# Patient Record
Sex: MALE | Race: WHITE | Employment: FULL TIME
[De-identification: names, ages, dates, MRNs, and addresses within clinical notes are randomized per-mention and may not be internally consistent; named-entity substitution may affect disease eponyms.]

---

## 2017-01-04 RX ORDER — OXYCODONE HYDROCHLORIDE AND ACETAMINOPHEN 5; 325 MG/1; MG/1
TABLET ORAL
Qty: 150 TABLET | Refills: 0 | Status: SHIPPED | OUTPATIENT
Start: 2017-01-04 | End: 2017-02-01 | Stop reason: SDUPTHER

## 2017-02-01 RX ORDER — OXYCODONE HYDROCHLORIDE AND ACETAMINOPHEN 5; 325 MG/1; MG/1
TABLET ORAL
Qty: 150 TABLET | Refills: 0 | Status: SHIPPED | OUTPATIENT
Start: 2017-02-01 | End: 2017-03-06 | Stop reason: SDUPTHER

## 2017-03-01 RX ORDER — OXYCODONE HYDROCHLORIDE AND ACETAMINOPHEN 5; 325 MG/1; MG/1
TABLET ORAL
Qty: 150 TABLET | Refills: 0 | OUTPATIENT
Start: 2017-03-01

## 2017-03-06 ENCOUNTER — OFFICE VISIT (OUTPATIENT)
Dept: FAMILY MEDICINE CLINIC | Facility: CLINIC | Age: 42
End: 2017-03-06

## 2017-03-06 VITALS
DIASTOLIC BLOOD PRESSURE: 80 MMHG | BODY MASS INDEX: 27.2 KG/M2 | HEART RATE: 88 BPM | WEIGHT: 209 LBS | SYSTOLIC BLOOD PRESSURE: 110 MMHG | OXYGEN SATURATION: 98 %

## 2017-03-06 DIAGNOSIS — G89.29 CHRONIC LOW BACK PAIN WITH SCIATICA, SCIATICA LATERALITY UNSPECIFIED, UNSPECIFIED BACK PAIN LATERALITY: ICD-10-CM

## 2017-03-06 DIAGNOSIS — E13.9 DIABETES 1.5, MANAGED AS TYPE 2 (HCC): Primary | ICD-10-CM

## 2017-03-06 DIAGNOSIS — M54.40 CHRONIC LOW BACK PAIN WITH SCIATICA, SCIATICA LATERALITY UNSPECIFIED, UNSPECIFIED BACK PAIN LATERALITY: ICD-10-CM

## 2017-03-06 DIAGNOSIS — M79.672 FOOT PAIN, BILATERAL: ICD-10-CM

## 2017-03-06 DIAGNOSIS — M79.671 FOOT PAIN, BILATERAL: ICD-10-CM

## 2017-03-06 LAB — HBA1C MFR BLD: 6.6 %

## 2017-03-06 PROCEDURE — 83036 HEMOGLOBIN GLYCOSYLATED A1C: CPT

## 2017-03-06 PROCEDURE — 99214 OFFICE O/P EST MOD 30 MIN: CPT

## 2017-03-06 RX ORDER — DULOXETIN HYDROCHLORIDE 60 MG/1
60 CAPSULE, DELAYED RELEASE ORAL DAILY
Qty: 30 CAPSULE | Refills: 3 | Status: SHIPPED | OUTPATIENT
Start: 2017-03-06 | End: 2017-06-21 | Stop reason: SDUPTHER

## 2017-03-06 RX ORDER — OXYCODONE HYDROCHLORIDE AND ACETAMINOPHEN 5; 325 MG/1; MG/1
TABLET ORAL
Qty: 150 TABLET | Refills: 0 | Status: SHIPPED | OUTPATIENT
Start: 2017-03-06 | End: 2017-03-29 | Stop reason: SDUPTHER

## 2017-03-06 RX ORDER — ALPRAZOLAM 0.25 MG/1
0.25 TABLET ORAL DAILY PRN
Qty: 30 TABLET | Refills: 5 | Status: SHIPPED | OUTPATIENT
Start: 2017-03-06 | End: 2017-06-21 | Stop reason: SDUPTHER

## 2017-03-06 ASSESSMENT — ENCOUNTER SYMPTOMS
SHORTNESS OF BREATH: 0
VOMITING: 0
COUGH: 0
EYE PAIN: 0
CHEST TIGHTNESS: 0
SORE THROAT: 0
NAUSEA: 0
EYE DISCHARGE: 0

## 2017-03-06 ASSESSMENT — PATIENT HEALTH QUESTIONNAIRE - PHQ9
1. LITTLE INTEREST OR PLEASURE IN DOING THINGS: 0
SUM OF ALL RESPONSES TO PHQ QUESTIONS 1-9: 0
SUM OF ALL RESPONSES TO PHQ9 QUESTIONS 1 & 2: 0
2. FEELING DOWN, DEPRESSED OR HOPELESS: 0

## 2017-03-07 ENCOUNTER — HOSPITAL ENCOUNTER (OUTPATIENT)
Age: 42
Setting detail: SPECIMEN
Discharge: HOME | End: 2017-03-07
Payer: COMMERCIAL

## 2017-03-07 LAB
CREATININE URINE: 208.3 MG/DL (ref 39–259)
MICROALBUMIN/CREAT 24H UR: <12 MG/L
MICROALBUMIN/CREAT UR-RTO: 6 MCG/MG CREAT

## 2017-03-30 RX ORDER — OXYCODONE HYDROCHLORIDE AND ACETAMINOPHEN 5; 325 MG/1; MG/1
TABLET ORAL
Qty: 150 TABLET | Refills: 0 | Status: SHIPPED | OUTPATIENT
Start: 2017-03-30 | End: 2017-05-01 | Stop reason: SDUPTHER

## 2017-05-01 RX ORDER — OXYCODONE HYDROCHLORIDE AND ACETAMINOPHEN 5; 325 MG/1; MG/1
TABLET ORAL
Qty: 150 TABLET | Refills: 0 | Status: SHIPPED | OUTPATIENT
Start: 2017-05-01 | End: 2017-05-26 | Stop reason: SDUPTHER

## 2017-05-26 RX ORDER — OXYCODONE HYDROCHLORIDE AND ACETAMINOPHEN 5; 325 MG/1; MG/1
TABLET ORAL
Qty: 150 TABLET | Refills: 0 | Status: SHIPPED | OUTPATIENT
Start: 2017-05-26 | End: 2017-06-21 | Stop reason: SDUPTHER

## 2017-06-21 ENCOUNTER — OFFICE VISIT (OUTPATIENT)
Dept: FAMILY MEDICINE CLINIC | Age: 42
End: 2017-06-21
Payer: COMMERCIAL

## 2017-06-21 VITALS
HEART RATE: 96 BPM | OXYGEN SATURATION: 95 % | SYSTOLIC BLOOD PRESSURE: 118 MMHG | HEIGHT: 74 IN | BODY MASS INDEX: 25.15 KG/M2 | DIASTOLIC BLOOD PRESSURE: 76 MMHG | WEIGHT: 196 LBS

## 2017-06-21 DIAGNOSIS — M79.672 FOOT PAIN, BILATERAL: ICD-10-CM

## 2017-06-21 DIAGNOSIS — E13.9 DIABETES 1.5, MANAGED AS TYPE 2 (HCC): Primary | ICD-10-CM

## 2017-06-21 DIAGNOSIS — G89.29 CHRONIC LOW BACK PAIN WITH SCIATICA, SCIATICA LATERALITY UNSPECIFIED, UNSPECIFIED BACK PAIN LATERALITY: ICD-10-CM

## 2017-06-21 DIAGNOSIS — M54.40 CHRONIC LOW BACK PAIN WITH SCIATICA, SCIATICA LATERALITY UNSPECIFIED, UNSPECIFIED BACK PAIN LATERALITY: ICD-10-CM

## 2017-06-21 DIAGNOSIS — M79.671 FOOT PAIN, BILATERAL: ICD-10-CM

## 2017-06-21 PROCEDURE — 83036 HEMOGLOBIN GLYCOSYLATED A1C: CPT

## 2017-06-21 PROCEDURE — 99213 OFFICE O/P EST LOW 20 MIN: CPT

## 2017-06-21 RX ORDER — ALPRAZOLAM 0.25 MG/1
0.25 TABLET ORAL DAILY PRN
Qty: 30 TABLET | Refills: 5 | Status: SHIPPED | OUTPATIENT
Start: 2017-06-21 | End: 2017-07-06 | Stop reason: SDUPTHER

## 2017-06-21 RX ORDER — DULOXETIN HYDROCHLORIDE 60 MG/1
60 CAPSULE, DELAYED RELEASE ORAL DAILY
Qty: 30 CAPSULE | Refills: 5 | Status: SHIPPED | OUTPATIENT
Start: 2017-06-21 | End: 2018-12-12 | Stop reason: SDUPTHER

## 2017-06-21 RX ORDER — OXYCODONE HYDROCHLORIDE AND ACETAMINOPHEN 5; 325 MG/1; MG/1
TABLET ORAL
Qty: 150 TABLET | Refills: 0 | Status: SHIPPED | OUTPATIENT
Start: 2017-06-21 | End: 2017-07-06 | Stop reason: SDUPTHER

## 2017-06-21 ASSESSMENT — ENCOUNTER SYMPTOMS: BACK PAIN: 1

## 2017-06-22 LAB — HBA1C MFR BLD: 6 %

## 2017-07-06 DIAGNOSIS — M54.40 CHRONIC LOW BACK PAIN WITH SCIATICA, SCIATICA LATERALITY UNSPECIFIED, UNSPECIFIED BACK PAIN LATERALITY: ICD-10-CM

## 2017-07-06 DIAGNOSIS — G89.29 CHRONIC LOW BACK PAIN WITH SCIATICA, SCIATICA LATERALITY UNSPECIFIED, UNSPECIFIED BACK PAIN LATERALITY: ICD-10-CM

## 2017-07-07 RX ORDER — ALPRAZOLAM 0.25 MG/1
0.25 TABLET ORAL DAILY PRN
Qty: 30 TABLET | Refills: 5 | Status: SHIPPED | OUTPATIENT
Start: 2017-07-07 | End: 2018-02-28 | Stop reason: SDUPTHER

## 2017-07-07 RX ORDER — OXYCODONE HYDROCHLORIDE AND ACETAMINOPHEN 5; 325 MG/1; MG/1
TABLET ORAL
Qty: 150 TABLET | Refills: 0 | Status: SHIPPED | OUTPATIENT
Start: 2017-07-07 | End: 2017-08-21 | Stop reason: SDUPTHER

## 2017-08-21 DIAGNOSIS — G89.29 CHRONIC LOW BACK PAIN WITH SCIATICA, SCIATICA LATERALITY UNSPECIFIED, UNSPECIFIED BACK PAIN LATERALITY: ICD-10-CM

## 2017-08-21 DIAGNOSIS — M54.40 CHRONIC LOW BACK PAIN WITH SCIATICA, SCIATICA LATERALITY UNSPECIFIED, UNSPECIFIED BACK PAIN LATERALITY: ICD-10-CM

## 2017-08-21 RX ORDER — OXYCODONE HYDROCHLORIDE AND ACETAMINOPHEN 5; 325 MG/1; MG/1
TABLET ORAL
Qty: 150 TABLET | Refills: 0 | Status: SHIPPED | OUTPATIENT
Start: 2017-08-21 | End: 2018-02-28 | Stop reason: SDUPTHER

## 2018-02-28 PROBLEM — F98.8 ATTENTION DEFICIT DISORDER (ADD) IN ADULT: Status: ACTIVE | Noted: 2018-02-28

## 2018-02-28 PROBLEM — F41.1 GENERALIZED ANXIETY DISORDER: Status: ACTIVE | Noted: 2018-02-28

## 2018-12-12 DIAGNOSIS — F98.8 ATTENTION DEFICIT DISORDER (ADD) IN ADULT: ICD-10-CM

## 2018-12-12 DIAGNOSIS — M79.672 FOOT PAIN, BILATERAL: ICD-10-CM

## 2018-12-12 DIAGNOSIS — M54.40 CHRONIC LOW BACK PAIN WITH SCIATICA, SCIATICA LATERALITY UNSPECIFIED, UNSPECIFIED BACK PAIN LATERALITY: ICD-10-CM

## 2018-12-12 DIAGNOSIS — M79.671 FOOT PAIN, BILATERAL: ICD-10-CM

## 2018-12-12 DIAGNOSIS — G89.29 CHRONIC LOW BACK PAIN WITH SCIATICA, SCIATICA LATERALITY UNSPECIFIED, UNSPECIFIED BACK PAIN LATERALITY: ICD-10-CM

## 2018-12-12 DIAGNOSIS — F41.1 GENERALIZED ANXIETY DISORDER: ICD-10-CM

## 2018-12-12 NOTE — TELEPHONE ENCOUNTER
Last seen 9/25/18  Last fill 11/19/18  Appointment scheduled for 12/26/18 at 4:30 PM   Please approve or deny D[address] in [place]

## 2018-12-13 RX ORDER — DULOXETIN HYDROCHLORIDE 60 MG/1
60 CAPSULE, DELAYED RELEASE ORAL DAILY
Qty: 30 CAPSULE | Refills: 5 | Status: SHIPPED | OUTPATIENT
Start: 2018-12-13 | End: 2019-06-21 | Stop reason: SDUPTHER

## 2018-12-13 RX ORDER — OXYCODONE HYDROCHLORIDE AND ACETAMINOPHEN 5; 325 MG/1; MG/1
1 TABLET ORAL
Qty: 150 TABLET | Refills: 0 | Status: SHIPPED | OUTPATIENT
Start: 2018-12-13 | End: 2019-01-12

## 2018-12-13 RX ORDER — DEXTROAMPHETAMINE SACCHARATE, AMPHETAMINE ASPARTATE, DEXTROAMPHETAMINE SULFATE AND AMPHETAMINE SULFATE 2.5; 2.5; 2.5; 2.5 MG/1; MG/1; MG/1; MG/1
10 TABLET ORAL 2 TIMES DAILY
Qty: 60 TABLET | Refills: 0 | Status: SHIPPED | OUTPATIENT
Start: 2018-12-13 | End: 2019-06-21 | Stop reason: ALTCHOICE

## 2018-12-13 RX ORDER — ALPRAZOLAM 0.25 MG/1
0.25 TABLET ORAL 2 TIMES DAILY PRN
Qty: 60 TABLET | Refills: 2 | Status: SHIPPED | OUTPATIENT
Start: 2018-12-13 | End: 2019-01-12

## 2019-01-15 ENCOUNTER — OFFICE VISIT (OUTPATIENT)
Dept: PRIMARY CARE CLINIC | Age: 44
End: 2019-01-15
Payer: COMMERCIAL

## 2019-01-15 VITALS
SYSTOLIC BLOOD PRESSURE: 132 MMHG | BODY MASS INDEX: 28.28 KG/M2 | OXYGEN SATURATION: 97 % | WEIGHT: 208.8 LBS | HEART RATE: 100 BPM | HEIGHT: 72 IN | DIASTOLIC BLOOD PRESSURE: 82 MMHG

## 2019-01-15 DIAGNOSIS — Z79.899 HIGH RISK MEDICATION USE: ICD-10-CM

## 2019-01-15 DIAGNOSIS — F41.1 GENERALIZED ANXIETY DISORDER: ICD-10-CM

## 2019-01-15 DIAGNOSIS — E13.9 DIABETES 1.5, MANAGED AS TYPE 2 (HCC): ICD-10-CM

## 2019-01-15 DIAGNOSIS — M54.41 CHRONIC LOW BACK PAIN WITH RIGHT-SIDED SCIATICA, UNSPECIFIED BACK PAIN LATERALITY: Primary | ICD-10-CM

## 2019-01-15 DIAGNOSIS — G89.29 CHRONIC LOW BACK PAIN WITH RIGHT-SIDED SCIATICA, UNSPECIFIED BACK PAIN LATERALITY: Primary | ICD-10-CM

## 2019-01-15 DIAGNOSIS — F98.8 ATTENTION DEFICIT DISORDER (ADD) IN ADULT: ICD-10-CM

## 2019-01-15 PROCEDURE — G8417 CALC BMI ABV UP PARAM F/U: HCPCS | Performed by: FAMILY MEDICINE

## 2019-01-15 PROCEDURE — G8484 FLU IMMUNIZE NO ADMIN: HCPCS | Performed by: FAMILY MEDICINE

## 2019-01-15 PROCEDURE — 3046F HEMOGLOBIN A1C LEVEL >9.0%: CPT | Performed by: FAMILY MEDICINE

## 2019-01-15 PROCEDURE — 99213 OFFICE O/P EST LOW 20 MIN: CPT | Performed by: FAMILY MEDICINE

## 2019-01-15 PROCEDURE — 2022F DILAT RTA XM EVC RTNOPTHY: CPT | Performed by: FAMILY MEDICINE

## 2019-01-15 PROCEDURE — 4004F PT TOBACCO SCREEN RCVD TLK: CPT | Performed by: FAMILY MEDICINE

## 2019-01-15 PROCEDURE — G8427 DOCREV CUR MEDS BY ELIG CLIN: HCPCS | Performed by: FAMILY MEDICINE

## 2019-01-15 RX ORDER — OXYCODONE HYDROCHLORIDE AND ACETAMINOPHEN 5; 325 MG/1; MG/1
1 TABLET ORAL
COMMUNITY
End: 2019-01-15 | Stop reason: SDUPTHER

## 2019-01-15 RX ORDER — ALPRAZOLAM 0.25 MG/1
0.25 TABLET ORAL
COMMUNITY
End: 2019-01-15 | Stop reason: SDUPTHER

## 2019-01-15 RX ORDER — DEXTROAMPHETAMINE SACCHARATE, AMPHETAMINE ASPARTATE, DEXTROAMPHETAMINE SULFATE AND AMPHETAMINE SULFATE 3.125; 3.125; 3.125; 3.125 MG/1; MG/1; MG/1; MG/1
12.5 TABLET ORAL 2 TIMES DAILY
Qty: 60 TABLET | Refills: 0 | Status: SHIPPED | OUTPATIENT
Start: 2019-01-15 | End: 2019-06-21 | Stop reason: ALTCHOICE

## 2019-01-15 RX ORDER — OXYCODONE HYDROCHLORIDE AND ACETAMINOPHEN 5; 325 MG/1; MG/1
1 TABLET ORAL EVERY 4 HOURS PRN
Qty: 150 TABLET | Refills: 0 | Status: SHIPPED | OUTPATIENT
Start: 2019-01-15 | End: 2019-02-14

## 2019-01-15 RX ORDER — ALPRAZOLAM 0.5 MG/1
0.5 TABLET ORAL 2 TIMES DAILY PRN
Qty: 60 TABLET | Refills: 2 | Status: SHIPPED | OUTPATIENT
Start: 2019-01-15 | End: 2019-02-14

## 2019-01-15 ASSESSMENT — ENCOUNTER SYMPTOMS
ABDOMINAL PAIN: 0
EYE DISCHARGE: 0
WHEEZING: 0
DIARRHEA: 0
VOMITING: 0
RHINORRHEA: 0
SHORTNESS OF BREATH: 0
EYE REDNESS: 0
COUGH: 0
NAUSEA: 0
SORE THROAT: 0
BACK PAIN: 1

## 2019-01-22 DIAGNOSIS — Z79.899 HIGH RISK MEDICATION USE: ICD-10-CM

## 2019-06-21 DIAGNOSIS — M79.671 FOOT PAIN, BILATERAL: ICD-10-CM

## 2019-06-21 DIAGNOSIS — M79.672 FOOT PAIN, BILATERAL: ICD-10-CM

## 2019-06-21 DIAGNOSIS — E13.9 DIABETES 1.5, MANAGED AS TYPE 2 (HCC): ICD-10-CM

## 2019-06-21 RX ORDER — DULOXETIN HYDROCHLORIDE 60 MG/1
60 CAPSULE, DELAYED RELEASE ORAL DAILY
Qty: 30 CAPSULE | Refills: 1 | Status: SHIPPED | OUTPATIENT
Start: 2019-06-21 | End: 2019-08-21 | Stop reason: SDUPTHER

## 2019-07-10 ENCOUNTER — OFFICE VISIT (OUTPATIENT)
Dept: PRIMARY CARE CLINIC | Age: 44
End: 2019-07-10
Payer: COMMERCIAL

## 2019-07-10 VITALS
DIASTOLIC BLOOD PRESSURE: 80 MMHG | OXYGEN SATURATION: 94 % | HEART RATE: 92 BPM | SYSTOLIC BLOOD PRESSURE: 134 MMHG | WEIGHT: 212.8 LBS | HEIGHT: 72 IN | BODY MASS INDEX: 28.82 KG/M2

## 2019-07-10 DIAGNOSIS — E13.9 DIABETES 1.5, MANAGED AS TYPE 2 (HCC): Primary | ICD-10-CM

## 2019-07-10 DIAGNOSIS — Z13.220 ENCOUNTER FOR LIPID SCREENING FOR CARDIOVASCULAR DISEASE: ICD-10-CM

## 2019-07-10 DIAGNOSIS — Z00.00 ANNUAL PHYSICAL EXAM: ICD-10-CM

## 2019-07-10 DIAGNOSIS — Z13.6 ENCOUNTER FOR LIPID SCREENING FOR CARDIOVASCULAR DISEASE: ICD-10-CM

## 2019-07-10 DIAGNOSIS — F41.1 GENERALIZED ANXIETY DISORDER: ICD-10-CM

## 2019-07-10 LAB
CREATININE URINE POCT: NORMAL
HBA1C MFR BLD: 9.1 %
MICROALBUMIN/CREAT 24H UR: NORMAL MG/G{CREAT}
MICROALBUMIN/CREAT UR-RTO: NORMAL

## 2019-07-10 PROCEDURE — 83036 HEMOGLOBIN GLYCOSYLATED A1C: CPT | Performed by: FAMILY MEDICINE

## 2019-07-10 PROCEDURE — 4004F PT TOBACCO SCREEN RCVD TLK: CPT | Performed by: FAMILY MEDICINE

## 2019-07-10 PROCEDURE — 2022F DILAT RTA XM EVC RTNOPTHY: CPT | Performed by: FAMILY MEDICINE

## 2019-07-10 PROCEDURE — 3046F HEMOGLOBIN A1C LEVEL >9.0%: CPT | Performed by: FAMILY MEDICINE

## 2019-07-10 PROCEDURE — 82044 UR ALBUMIN SEMIQUANTITATIVE: CPT | Performed by: FAMILY MEDICINE

## 2019-07-10 PROCEDURE — G8427 DOCREV CUR MEDS BY ELIG CLIN: HCPCS | Performed by: FAMILY MEDICINE

## 2019-07-10 PROCEDURE — 99213 OFFICE O/P EST LOW 20 MIN: CPT | Performed by: FAMILY MEDICINE

## 2019-07-10 PROCEDURE — G8417 CALC BMI ABV UP PARAM F/U: HCPCS | Performed by: FAMILY MEDICINE

## 2019-07-10 ASSESSMENT — PATIENT HEALTH QUESTIONNAIRE - PHQ9
SUM OF ALL RESPONSES TO PHQ QUESTIONS 1-9: 0
1. LITTLE INTEREST OR PLEASURE IN DOING THINGS: 0
2. FEELING DOWN, DEPRESSED OR HOPELESS: 0
SUM OF ALL RESPONSES TO PHQ9 QUESTIONS 1 & 2: 0
SUM OF ALL RESPONSES TO PHQ QUESTIONS 1-9: 0

## 2019-07-10 ASSESSMENT — ENCOUNTER SYMPTOMS
DIARRHEA: 0
EYE DISCHARGE: 0
EYE REDNESS: 0
NAUSEA: 0
SHORTNESS OF BREATH: 0
WHEEZING: 0
VOMITING: 0
RHINORRHEA: 0
COUGH: 0
ABDOMINAL PAIN: 0
SORE THROAT: 0

## 2019-07-10 NOTE — PROGRESS NOTES
[facility] PRIMARY CARE  [id number] [address] [facility]  Dept: [phone]    Gabriella Alonso is a 44 y.o. male who presents today for his medical conditions/complaintsas noted below. Chief Complaint   Patient presents with    Diabetes     Follow up       HPI:     HPI   Pt here for diabetic check up. No chest pain. No low blood sugar reactions. No fever or chills. Has not been checking his sugars. No results found for: LDLCHOLESTEROL, LDLCALC    (goal LDL is <100)   AST (IU/L)   Date Value   12/30/2015 18     ALT (IU/L)   Date Value   12/30/2015 28     BUN (mg/dl)   Date Value   12/30/2015 9 (L)     BP Readings from Last 3 Encounters:   07/10/19 134/80   01/15/19 132/82   09/25/18 (!) 144/98          (goal 120/80)    History reviewed. No pertinent past medical history. History reviewed. No pertinent surgical history. History reviewed. No pertinent family history. Social History     Tobacco Use    Smoking status: Current Every Day Smoker     Packs/day: 1.00    Smokeless tobacco: Never Used   Substance Use Topics    Alcohol use: No      Current Outpatient Medications   Medication Sig Dispense Refill    metFORMIN (GLUCOPHAGE) 500 MG tablet Take 2 tablets by mouth 2 times daily (with meals) 120 tablet 3    SITagliptin (JANUVIA) 50 MG tablet Take 1 tablet by mouth daily 30 tablet 3    DULoxetine (CYMBALTA) 60 MG extended release capsule Take 1 capsule by mouth daily 30 capsule 1    glucose blood VI test strips (ACCU-CHEK SMARTVIEW) strip 1 each by In Vitro route daily 100 each 3    Lancets MISC 1 each by Does not apply route daily 100 each 3     No current facility-administered medications for this visit.       No Known Allergies    Health Maintenance   Topic Date Due    Diabetic retinal exam  11/01/1986    Lipid screen  11/01/1986    HIV screen  11/01/1991    Hepatitis B Vaccine (1 of 3 - Risk 3-dose series) 11/01/1995    DTaP/Tdap/Td vaccine (1 - Tdap) 11/01/1995    A1C test (Diabetic or Prediabetic)  08/16/2018    Diabetic foot exam  05/16/2019    Diabetic microalbuminuria test  05/16/2019    Flu vaccine (1) 09/01/2019    Pneumococcal 0-64 years Vaccine  Completed       Subjective:      Review of Systems   Constitutional: Negative for chills and fever. HENT: Negative for rhinorrhea and sore throat. Eyes: Negative for discharge and redness. Respiratory: Negative for cough, shortness of breath and wheezing. Cardiovascular: Negative for chest pain and palpitations. Gastrointestinal: Negative for abdominal pain, diarrhea, nausea and vomiting. Genitourinary: Negative for dysuria and frequency. Musculoskeletal: Negative for arthralgias and myalgias. Neurological: Negative for dizziness, light-headedness and headaches. Psychiatric/Behavioral: Negative for sleep disturbance. Objective:     /80   Pulse 92   Ht 6' 0.48\" (1.841 m)   Wt 212 lb 12.8 oz (96.5 kg)   SpO2 94%   BMI 28.48 kg/m²   Physical Exam   Constitutional: He is oriented to person, place, and time. He appears well-developed and well-nourished. No distress. HENT:   Head: Normocephalic and atraumatic. Right Ear: External ear normal.   Left Ear: External ear normal.   Mouth/Throat: Oropharynx is clear and moist.   Eyes: Pupils are equal, round, and reactive to light. Conjunctivae are normal. Right eye exhibits no discharge. Left eye exhibits no discharge. Neck: No tracheal deviation present. No thyromegaly present. Cardiovascular: Normal rate, regular rhythm and normal heart sounds. No carotid bruits   Pulmonary/Chest: Effort normal and breath sounds normal. No respiratory distress. He has no wheezes. Musculoskeletal: He exhibits no edema. Lymphadenopathy:     He has no cervical adenopathy. Neurological: He is alert and oriented to person, place, and time. Diabetic foot check: Bunions: none . Hammertoes none. Plantar calluses none.   No cyanosis or clubbing. sensation intact on 2 of 6 test points with the 10 gram filament. Dorsalis pedis pulses intact bilaterally. Capillary refill at the toes was less than 2 seconds. No skin breakdown, erythema, blisters, scaling, or ulcers. No evidence of fungal infection. Skin: Skin is warm and dry. No rash noted. Psychiatric: He has a normal mood and affect. His behavior is normal. Thought content normal.   Nursing note and vitals reviewed. Assessment:       Diagnosis Orders   1. Diabetes 1.5, managed as type 2 (HCC)  NC COLLECTION CAPILLARY BLOOD SPECIMEN    POCT glycosylated hemoglobin (Hb A1C)    metFORMIN (GLUCOPHAGE) 500 MG tablet    SITagliptin (JANUVIA) 50 MG tablet    POCT microalbumin    HM DIABETES FOOT EXAM   2. Generalized anxiety disorder     3. Encounter for lipid screening for cardiovascular disease  Lipid, Fasting    Basic Metabolic Panel, Fasting   4. Annual physical exam  Hepatic Function Panel        Plan:    increase metformin to 100mg bid  Add Januvia 50mg daily  Urine micral ordered  Labs ordered  Advised pt cannot order control meds based on his last drug screen. Refer to [address] [place] [address] for methadone or suboxone. Return in about 3 months (around 10/10/2019).     Orders Placed This Encounter   Procedures    Lipid, Fasting     Standing Status:   Future     Standing Expiration Date:   7/10/2020    Basic Metabolic Panel, Fasting     Standing Status:   Future     Standing Expiration Date:   7/10/2020    Hepatic Function Panel     Standing Status:   Future     Standing Expiration Date:   7/10/2020    POCT glycosylated hemoglobin (Hb A1C)    POCT microalbumin    HM DIABETES FOOT EXAM    NC COLLECTION CAPILLARY BLOOD SPECIMEN     Orders Placed This Encounter   Medications    metFORMIN (GLUCOPHAGE) 500 MG tablet     Sig: Take 2 tablets by mouth 2 times daily (with meals)     Dispense:  120 tablet     Refill:  3    SITagliptin (JANUVIA) 50 MG tablet     Sig: Take 1 tablet by mouth daily

## 2019-07-26 ENCOUNTER — TELEPHONE (OUTPATIENT)
Dept: PEDIATRIC UROLOGY | Age: 44
End: 2019-07-26

## 2019-08-21 DIAGNOSIS — M79.671 FOOT PAIN, BILATERAL: ICD-10-CM

## 2019-08-21 DIAGNOSIS — M79.672 FOOT PAIN, BILATERAL: ICD-10-CM

## 2019-08-21 RX ORDER — DULOXETIN HYDROCHLORIDE 60 MG/1
CAPSULE, DELAYED RELEASE ORAL
Qty: 30 CAPSULE | Refills: 1 | Status: SHIPPED | OUTPATIENT
Start: 2019-08-21 | End: 2019-10-21 | Stop reason: SDUPTHER

## 2019-11-19 DIAGNOSIS — M79.671 FOOT PAIN, BILATERAL: ICD-10-CM

## 2019-11-19 DIAGNOSIS — M79.672 FOOT PAIN, BILATERAL: ICD-10-CM

## 2019-11-19 DIAGNOSIS — E13.9 DIABETES 1.5, MANAGED AS TYPE 2 (HCC): ICD-10-CM

## 2019-11-19 RX ORDER — DULOXETIN HYDROCHLORIDE 60 MG/1
60 CAPSULE, DELAYED RELEASE ORAL DAILY
Qty: 30 CAPSULE | Refills: 0 | Status: SHIPPED | OUTPATIENT
Start: 2019-11-19 | End: 2019-12-20 | Stop reason: SDUPTHER

## 2019-11-26 ENCOUNTER — TELEPHONE (OUTPATIENT)
Dept: PRIMARY CARE CLINIC | Age: 44
End: 2019-11-26

## 2019-12-20 ENCOUNTER — OFFICE VISIT (OUTPATIENT)
Dept: PRIMARY CARE CLINIC | Age: 44
End: 2019-12-20
Payer: COMMERCIAL

## 2019-12-20 VITALS
HEIGHT: 73 IN | WEIGHT: 199.5 LBS | DIASTOLIC BLOOD PRESSURE: 82 MMHG | SYSTOLIC BLOOD PRESSURE: 138 MMHG | BODY MASS INDEX: 26.44 KG/M2 | OXYGEN SATURATION: 94 % | RESPIRATION RATE: 16 BRPM | HEART RATE: 75 BPM

## 2019-12-20 DIAGNOSIS — E11.42 DIABETIC POLYNEUROPATHY ASSOCIATED WITH TYPE 2 DIABETES MELLITUS (HCC): ICD-10-CM

## 2019-12-20 DIAGNOSIS — M79.671 FOOT PAIN, BILATERAL: ICD-10-CM

## 2019-12-20 DIAGNOSIS — M79.672 FOOT PAIN, BILATERAL: ICD-10-CM

## 2019-12-20 DIAGNOSIS — R60.0 LOCALIZED EDEMA: ICD-10-CM

## 2019-12-20 DIAGNOSIS — E13.9 DIABETES 1.5, MANAGED AS TYPE 2 (HCC): Primary | ICD-10-CM

## 2019-12-20 LAB — HBA1C MFR BLD: 6.4 %

## 2019-12-20 PROCEDURE — G8484 FLU IMMUNIZE NO ADMIN: HCPCS | Performed by: NURSE PRACTITIONER

## 2019-12-20 PROCEDURE — 99213 OFFICE O/P EST LOW 20 MIN: CPT | Performed by: NURSE PRACTITIONER

## 2019-12-20 PROCEDURE — 2022F DILAT RTA XM EVC RTNOPTHY: CPT | Performed by: NURSE PRACTITIONER

## 2019-12-20 PROCEDURE — 83036 HEMOGLOBIN GLYCOSYLATED A1C: CPT | Performed by: NURSE PRACTITIONER

## 2019-12-20 PROCEDURE — 4004F PT TOBACCO SCREEN RCVD TLK: CPT | Performed by: NURSE PRACTITIONER

## 2019-12-20 PROCEDURE — G8427 DOCREV CUR MEDS BY ELIG CLIN: HCPCS | Performed by: NURSE PRACTITIONER

## 2019-12-20 PROCEDURE — G8417 CALC BMI ABV UP PARAM F/U: HCPCS | Performed by: NURSE PRACTITIONER

## 2019-12-20 PROCEDURE — 3044F HG A1C LEVEL LT 7.0%: CPT | Performed by: NURSE PRACTITIONER

## 2019-12-20 RX ORDER — DULOXETIN HYDROCHLORIDE 60 MG/1
60 CAPSULE, DELAYED RELEASE ORAL DAILY
Qty: 30 CAPSULE | Refills: 1 | Status: SHIPPED | OUTPATIENT
Start: 2019-12-20 | End: 2020-02-21 | Stop reason: SDUPTHER

## 2019-12-20 RX ORDER — FUROSEMIDE 20 MG/1
20 TABLET ORAL DAILY
Qty: 30 TABLET | Refills: 3 | Status: SHIPPED | OUTPATIENT
Start: 2019-12-20 | End: 2020-10-19 | Stop reason: SDUPTHER

## 2019-12-20 ASSESSMENT — ENCOUNTER SYMPTOMS
BACK PAIN: 0
EYE PAIN: 0
DIARRHEA: 0
CONSTIPATION: 0
RHINORRHEA: 0
CHOKING: 0
NAUSEA: 0
EYE REDNESS: 0
SHORTNESS OF BREATH: 0

## 2020-02-21 RX ORDER — DULOXETIN HYDROCHLORIDE 60 MG/1
60 CAPSULE, DELAYED RELEASE ORAL DAILY
Qty: 30 CAPSULE | Refills: 1 | Status: SHIPPED | OUTPATIENT
Start: 2020-02-21 | End: 2020-04-22 | Stop reason: SDUPTHER

## 2020-04-22 RX ORDER — DULOXETIN HYDROCHLORIDE 60 MG/1
60 CAPSULE, DELAYED RELEASE ORAL DAILY
Qty: 90 CAPSULE | Refills: 1 | Status: SHIPPED | OUTPATIENT
Start: 2020-04-22 | End: 2020-10-16 | Stop reason: SDUPTHER

## 2020-10-15 NOTE — TELEPHONE ENCOUNTER
Patient requests a medication refill sent to [facility] in [place]. Patient will be out of medication Sat 10/17. Patient scheduled an appt with Edel Posey for [facility] 10/19/20.      Last OV: 12/20/19  Next OV: 10/19/20

## 2020-10-16 RX ORDER — DULOXETIN HYDROCHLORIDE 60 MG/1
60 CAPSULE, DELAYED RELEASE ORAL DAILY
Qty: 90 CAPSULE | Refills: 0 | Status: SHIPPED | OUTPATIENT
Start: 2020-10-16 | End: 2020-10-19 | Stop reason: SDUPTHER

## 2020-10-16 NOTE — TELEPHONE ENCOUNTER
Left msg informing patient that requested medication was sent to pharm. I asked that he call us back with any questions.  Please advise

## 2020-10-19 ENCOUNTER — OFFICE VISIT (OUTPATIENT)
Dept: PRIMARY CARE CLINIC | Age: 45
End: 2020-10-19
Payer: COMMERCIAL

## 2020-10-19 VITALS
HEART RATE: 92 BPM | DIASTOLIC BLOOD PRESSURE: 78 MMHG | OXYGEN SATURATION: 97 % | WEIGHT: 194.8 LBS | TEMPERATURE: 96.8 F | BODY MASS INDEX: 26.06 KG/M2 | SYSTOLIC BLOOD PRESSURE: 118 MMHG

## 2020-10-19 LAB
CREATININE URINE POCT: NORMAL
HBA1C MFR BLD: 6.1 %
MICROALBUMIN/CREAT 24H UR: NORMAL MG/G{CREAT}
MICROALBUMIN/CREAT UR-RTO: NORMAL

## 2020-10-19 PROCEDURE — 83036 HEMOGLOBIN GLYCOSYLATED A1C: CPT | Performed by: PHYSICIAN ASSISTANT

## 2020-10-19 PROCEDURE — 99214 OFFICE O/P EST MOD 30 MIN: CPT | Performed by: PHYSICIAN ASSISTANT

## 2020-10-19 PROCEDURE — 4004F PT TOBACCO SCREEN RCVD TLK: CPT | Performed by: PHYSICIAN ASSISTANT

## 2020-10-19 PROCEDURE — G8484 FLU IMMUNIZE NO ADMIN: HCPCS | Performed by: PHYSICIAN ASSISTANT

## 2020-10-19 PROCEDURE — G8417 CALC BMI ABV UP PARAM F/U: HCPCS | Performed by: PHYSICIAN ASSISTANT

## 2020-10-19 PROCEDURE — 82044 UR ALBUMIN SEMIQUANTITATIVE: CPT | Performed by: PHYSICIAN ASSISTANT

## 2020-10-19 PROCEDURE — 2022F DILAT RTA XM EVC RTNOPTHY: CPT | Performed by: PHYSICIAN ASSISTANT

## 2020-10-19 PROCEDURE — 3046F HEMOGLOBIN A1C LEVEL >9.0%: CPT | Performed by: PHYSICIAN ASSISTANT

## 2020-10-19 PROCEDURE — G8427 DOCREV CUR MEDS BY ELIG CLIN: HCPCS | Performed by: PHYSICIAN ASSISTANT

## 2020-10-19 RX ORDER — FUROSEMIDE 20 MG/1
20 TABLET ORAL DAILY
Qty: 30 TABLET | Refills: 3 | Status: SHIPPED | OUTPATIENT
Start: 2020-10-19

## 2020-10-19 RX ORDER — DULOXETIN HYDROCHLORIDE 60 MG/1
60 CAPSULE, DELAYED RELEASE ORAL DAILY
Qty: 90 CAPSULE | Refills: 1 | Status: SHIPPED | OUTPATIENT
Start: 2020-10-19 | End: 2021-01-08 | Stop reason: SDUPTHER

## 2020-10-19 RX ORDER — GABAPENTIN 100 MG/1
100 CAPSULE ORAL 2 TIMES DAILY
Qty: 60 CAPSULE | Refills: 0 | Status: SHIPPED | OUTPATIENT
Start: 2020-10-19 | End: 2020-12-02 | Stop reason: SDUPTHER

## 2020-10-19 RX ORDER — METFORMIN HYDROCHLORIDE 500 MG/1
1000 TABLET, EXTENDED RELEASE ORAL
Qty: 60 TABLET | Refills: 0 | Status: SHIPPED | OUTPATIENT
Start: 2020-10-19

## 2020-10-19 ASSESSMENT — ENCOUNTER SYMPTOMS
EYE DISCHARGE: 0
PHOTOPHOBIA: 0
VOMITING: 0
SHORTNESS OF BREATH: 0
SINUS PRESSURE: 0
CONSTIPATION: 0
CHEST TIGHTNESS: 0
COUGH: 0
ABDOMINAL PAIN: 0
DIARRHEA: 0
SORE THROAT: 0
RHINORRHEA: 0
ABDOMINAL DISTENTION: 0

## 2020-10-19 ASSESSMENT — PATIENT HEALTH QUESTIONNAIRE - PHQ9
2. FEELING DOWN, DEPRESSED OR HOPELESS: 0
1. LITTLE INTEREST OR PLEASURE IN DOING THINGS: 0
SUM OF ALL RESPONSES TO PHQ QUESTIONS 1-9: 0
SUM OF ALL RESPONSES TO PHQ9 QUESTIONS 1 & 2: 0
SUM OF ALL RESPONSES TO PHQ QUESTIONS 1-9: 0
SUM OF ALL RESPONSES TO PHQ QUESTIONS 1-9: 0

## 2020-10-19 NOTE — PROGRESS NOTES
d      [facility] PRIMARY CARE  [address] [place] [id number]  Dept: [phone]    Omayra Kaur is a 38 y.o. male who presents today for his medical conditions/complaintsas noted below. Chief Complaint   Patient presents with    Diabetes     follow up. Pt due for A1C and microal     Medication Check       HPI:     HPI: The patient is checking his BS about once daily. It is usually about 170. He is also taking cymbalta lasix and metformin. He was having swelling in the feet. He is having no concerns. The patient has not had his blood work done lately. Patient does admit to the Metformin causing pretty severe diarrhea like to see if there is anything else that he can try. . He has had an eye exam. He will go again. No flu vaccine today. He is having burning in his feet and pins and needles. It is getting worse. Interfering with his sleep. No results found for: LDLCHOLESTEROL, LDLCALC    (goal LDL is <100)   AST (IU/L)   Date Value   12/30/2015 18     ALT (IU/L)   Date Value   12/30/2015 28     BUN (mg/dl)   Date Value   12/30/2015 9 (L)     BP Readings from Last 3 Encounters:   10/19/20 118/78   12/20/19 138/82   07/10/19 134/80          (goal 120/80)    No past medical history on file. No past surgical history on file. No family history on file.     Social History     Tobacco Use    Smoking status: Current Every Day Smoker     Packs/day: 1.00    Smokeless tobacco: Never Used   Substance Use Topics    Alcohol use: No      Current Outpatient Medications   Medication Sig Dispense Refill    furosemide (LASIX) 20 MG tablet Take 1 tablet by mouth daily 30 tablet 3    DULoxetine (CYMBALTA) 60 MG extended release capsule Take 1 capsule by mouth daily 90 capsule 1    metFORMIN (GLUCOPHAGE-XR) 500 MG extended release tablet Take 2 tablets by mouth daily (with breakfast) 60 tablet 0    gabapentin (NEURONTIN) 100 MG capsule Take 1 capsule by mouth 2 times daily for 30 days. Intended supply: 30 days 60 capsule 0    glucose blood VI test strips (ACCU-CHEK SMARTVIEW) strip 1 each by In Vitro route daily 100 each 3    Lancets MISC 1 each by Does not apply route daily 100 each 3     No current facility-administered medications for this visit. No Known Allergies    Health Maintenance   Topic Date Due    Diabetic retinal exam  11/01/1986    Lipid screen  11/01/1986    HIV screen  11/01/1991    Hepatitis B vaccine (1 of 3 - Risk 3-dose series) 11/01/1995    DTaP/Tdap/Td vaccine (1 - Tdap) 11/01/1995    Potassium monitoring  12/31/2016    Creatinine monitoring  12/31/2016    Diabetic microalbuminuria test  07/10/2020    Flu vaccine (1) 09/01/2020    Diabetic foot exam  12/20/2020    A1C test (Diabetic or Prediabetic)  12/20/2020    Pneumococcal 0-64 years Vaccine  Completed    Hepatitis A vaccine  Aged Out    Hib vaccine  Aged Out    Meningococcal (ACWY) vaccine  Aged Out       Subjective:      Review of Systems   Constitutional: Negative for chills, fever and unexpected weight change. HENT: Negative for congestion, hearing loss, rhinorrhea, sinus pressure and sore throat. Eyes: Negative for photophobia, discharge and visual disturbance. Respiratory: Negative for cough, chest tightness and shortness of breath. Cardiovascular: Negative for chest pain, palpitations and leg swelling. Gastrointestinal: Negative for abdominal distention, abdominal pain, constipation, diarrhea and vomiting. Endocrine: Negative for polydipsia and polyuria. Genitourinary: Negative for decreased urine volume, difficulty urinating, frequency and urgency. Musculoskeletal: Negative for arthralgias, gait problem and myalgias. Skin: Negative for rash. Allergic/Immunologic: Negative for food allergies. Neurological: Positive for numbness. Negative for dizziness, weakness and headaches. Hematological: Negative for adenopathy.    Psychiatric/Behavioral: Negative for dysphoric mood and sleep disturbance. The patient is not nervous/anxious. Objective:     /78   Pulse 92   Temp 96.8 °F (36 °C)   Wt 194 lb 12.8 oz (88.4 kg)   SpO2 97%   BMI 26.06 kg/m²   Physical Exam  Constitutional:       General: He is not in acute distress. Appearance: Normal appearance. He is not ill-appearing. HENT:      Head: Normocephalic and atraumatic. Right Ear: External ear normal.      Left Ear: External ear normal.      Nose: Nose normal.      Mouth/Throat:      Mouth: Mucous membranes are moist.   Eyes:      Extraocular Movements: Extraocular movements intact. Conjunctiva/sclera: Conjunctivae normal.      Pupils: Pupils are equal, round, and reactive to light. Neck:      Musculoskeletal: Normal range of motion and neck supple. Vascular: No carotid bruit. Cardiovascular:      Rate and Rhythm: Normal rate and regular rhythm. Pulses: Normal pulses. Heart sounds: Normal heart sounds. Pulmonary:      Effort: Pulmonary effort is normal. No respiratory distress. Breath sounds: Normal breath sounds. Abdominal:      General: Bowel sounds are normal. There is no distension. Tenderness: There is no abdominal tenderness. Musculoskeletal: Normal range of motion. Lymphadenopathy:      Cervical: No cervical adenopathy. Skin:     General: Skin is warm and dry. Neurological:      General: No focal deficit present. Mental Status: He is alert and oriented to person, place, and time. Psychiatric:         Mood and Affect: Mood normal.         Behavior: Behavior normal.         Thought Content: Thought content normal.         Assessment:       Diagnosis Orders   1. Neuropathy  gabapentin (NEURONTIN) 100 MG capsule   2. Diabetes 1.5, managed as type 2 ([address]  metFORMIN (GLUCOPHAGE-XR) 500 MG extended release tablet    Lipid, Fasting    Comprehensive Metabolic Panel   3. Localized edema  furosemide (LASIX) 20 MG tablet   4.  Foot pain, bilateral DULoxetine (CYMBALTA) 60 MG extended release capsule        Plan:       He was having diarrhea on the Metformin twice daily so switched to Metformin extended release tablets. Patient was having severe neuropathy that was interfering with his sleep so started on gabapentin 100 mg twice daily. Patient educated to schedule another eye exam and have them fax over results. Patient educated to cut [facility] out of his diet and at least switch to [address] due to large amounts of sugar and [facility]. Patient sugars are well controlled on current regimen so we will continue with just Metformin at this time. Comprehensive metabolic panel as well as a lipid panel were ordered. No flu shot at this time. No other concerns     Return in about 3 months (around 1/19/2021) for Follow up if symptoms persist or worsen, Diabetes, med check. Orders Placed This Encounter   Procedures    Lipid, Fasting     Standing Status:   Future     Standing Expiration Date:   10/19/2021    Comprehensive Metabolic Panel     Standing Status:   Future     Standing Expiration Date:   10/19/2021     Orders Placed This Encounter   Medications    furosemide (LASIX) 20 MG tablet     Sig: Take 1 tablet by mouth daily     Dispense:  30 tablet     Refill:  3    DULoxetine (CYMBALTA) 60 MG extended release capsule     Sig: Take 1 capsule by mouth daily     Dispense:  90 capsule     Refill:  1    metFORMIN (GLUCOPHAGE-XR) 500 MG extended release tablet     Sig: Take 2 tablets by mouth daily (with breakfast)     Dispense:  60 tablet     Refill:  0    gabapentin (NEURONTIN) 100 MG capsule     Sig: Take 1 capsule by mouth 2 times daily for 30 days. Intended supply: 30 days     Dispense:  60 capsule     Refill:  0       Patient given educationalmaterials - see patient instructions. Discussed use, benefit, and side effectsof prescribed medications. All patient questions answered. Pt voiced understanding. Reviewed health maintenance. Instructed to continue current medications, diet andexercise. Patient agreed with treatment plan. Follow up as directed.      Electronicallysigned by MEI Ho on 10/19/2020 at 4:12 PM

## 2020-12-02 ENCOUNTER — OFFICE VISIT (OUTPATIENT)
Dept: PRIMARY CARE CLINIC | Age: 45
End: 2020-12-02
Payer: COMMERCIAL

## 2020-12-02 ENCOUNTER — NURSE TRIAGE (OUTPATIENT)
Dept: OTHER | Facility: CLINIC | Age: 45
End: 2020-12-02

## 2020-12-02 ENCOUNTER — HOSPITAL ENCOUNTER (EMERGENCY)
Age: 45
Discharge: HOME | End: 2020-12-02
Attending: EMERGENCY MEDICINE
Payer: COMMERCIAL

## 2020-12-02 VITALS
HEART RATE: 81 BPM | BODY MASS INDEX: 26.51 KG/M2 | SYSTOLIC BLOOD PRESSURE: 120 MMHG | DIASTOLIC BLOOD PRESSURE: 98 MMHG | TEMPERATURE: 98.1 F | OXYGEN SATURATION: 94 % | WEIGHT: 200 LBS | RESPIRATION RATE: 16 BRPM | HEIGHT: 73 IN

## 2020-12-02 VITALS
HEART RATE: 78 BPM | TEMPERATURE: 96.4 F | SYSTOLIC BLOOD PRESSURE: 138 MMHG | OXYGEN SATURATION: 94 % | DIASTOLIC BLOOD PRESSURE: 72 MMHG

## 2020-12-02 PROCEDURE — 99283 EMERGENCY DEPT VISIT LOW MDM: CPT

## 2020-12-02 PROCEDURE — 6370000000 HC RX 637 (ALT 250 FOR IP): Performed by: EMERGENCY MEDICINE

## 2020-12-02 PROCEDURE — 99213 OFFICE O/P EST LOW 20 MIN: CPT | Performed by: PHYSICIAN ASSISTANT

## 2020-12-02 RX ORDER — GABAPENTIN 100 MG/1
100 CAPSULE ORAL 3 TIMES DAILY
Qty: 90 CAPSULE | Refills: 0 | Status: SHIPPED | OUTPATIENT
Start: 2020-12-02 | End: 2021-01-08 | Stop reason: SDUPTHER

## 2020-12-02 RX ORDER — DIAPER,BRIEF,INFANT-TODD,DISP
EACH MISCELLANEOUS ONCE
Status: COMPLETED | OUTPATIENT
Start: 2020-12-02 | End: 2020-12-02

## 2020-12-02 RX ORDER — PREDNISONE 20 MG/1
20 TABLET ORAL 2 TIMES DAILY
Qty: 10 TABLET | Refills: 0 | Status: SHIPPED | OUTPATIENT
Start: 2020-12-02 | End: 2020-12-07

## 2020-12-02 RX ORDER — GLUCOSAMINE HCL/CHONDROITIN SU 500-400 MG
CAPSULE ORAL
Qty: 200 STRIP | Refills: 0 | Status: SHIPPED | OUTPATIENT
Start: 2020-12-02

## 2020-12-02 RX ADMIN — HYDROCORTISONE: 1 CREAM TOPICAL at 01:15

## 2020-12-02 ASSESSMENT — ENCOUNTER SYMPTOMS
DIARRHEA: 0
ABDOMINAL DISTENTION: 0
DIARRHEA: 0
COUGH: 0
CHEST TIGHTNESS: 0
SHORTNESS OF BREATH: 0
SHORTNESS OF BREATH: 0
COUGH: 0
RHINORRHEA: 0
SINUS PRESSURE: 0
SORE THROAT: 0
ABDOMINAL PAIN: 0
EYE DISCHARGE: 0
VOMITING: 0
VOMITING: 0
PHOTOPHOBIA: 0
CONSTIPATION: 0
BACK PAIN: 0
ABDOMINAL PAIN: 0

## 2020-12-02 NOTE — TELEPHONE ENCOUNTER
Patient called pre-service center [facility]) [place] with red flag complaint. Brief description of triage: cellulitis    Triage indicates for patient to be seen in office today    Care advice provided, patient verbalizes understanding; denies any other questions or concerns; instructed to call back for any new or worsening symptoms. Writer provided warm transfer to [facility] for appointment scheduling. Attention Provider: Thank you for allowing me to participate in the care of your patient. The patient was connected to triage in response to information provided to the [facility]. Please do not respond through this encounter as the response is not directed to a shared pool. Reason for Disposition   Looks like a boil, infected sore, deep ulcer, or other infected rash (spreading redness, pus)    Answer Assessment - Initial Assessment Questions  1. ONSET: \"When did the swelling start? \" (e.g., minutes, hours, days)      A few weeks but worse the last two days    2. LOCATION: \"What part of the leg is swollen? \"  \"Are both legs swollen or just one leg? \"      Both legs below the knee    3. SEVERITY: \"How bad is the swelling? \" (e.g., localized; mild, moderate, severe)   - Localized - small area of swelling localized to one leg   - MILD pedal edema - swelling limited to foot and ankle, pitting edema < 1/4 inch (6 mm) deep, rest and elevation eliminate most or all swelling   - MODERATE edema - swelling of lower leg to knee, pitting edema > 1/4 inch (6 mm) deep, rest and elevation only partially reduce swelling   - SEVERE edema - swelling extends above knee, facial or hand swelling present       Moderate    4. REDNESS: \"Does the swelling look red or infected? \"      Both lower legs are red and \"rashy\" looking, but swollen    5. PAIN: \"Is the swelling painful to touch? \" If so, ask: \"How painful is it? \"   (Scale 1-10; mild, moderate or severe)      No pain    6. FEVER: \"Do you have a fever? \" If so, ask: \"What is it, how was it measured, and when did it start? \"       No    7. CAUSE: \"What do you think is causing the leg swelling? \"      ED last night said contact dermatitis. Patient google search says cellulitis    8. MEDICAL HISTORY: \"Do you have a history of heart failure, kidney disease, liver failure, or cancer? \"      No, but states he is diabetic  9. RECURRENT SYMPTOM: \"Have you had leg swelling before? \" If so, ask: \"When was the last time? \" \"What happened that time? \"      No    10. OTHER SYMPTOMS: \"Do you have any other symptoms? \" (e.g., chest pain, difficulty breathing)        No    11. PREGNANCY: \"Is there any chance you are pregnant? \" \"When was your last menstrual period? \"        N/A    Protocols used: LEG SWELLING AND EDEMA-ADULT-OH

## 2020-12-02 NOTE — PROGRESS NOTES
[address] [facility] PRIMARY CARE  [address] [facility] [address]  Dept: 1 [address] Monica Rodriguez is a 41 y.o. male who presents today for his medical conditions/complaintsas noted below. Chief Complaint   Patient presents with    Rash     started 2 days ago - pt denies burning or itching. HPI:     HPI: The patient was seen in the ER for contact dermatitis. He is now having a rash on lower. His sugars have been well controlled. No results found for: LDLCHOLESTEROL, LDLCALC    (goal LDL is <100)   AST (IU/L)   Date Value   12/30/2015 18     ALT (IU/L)   Date Value   12/30/2015 28     BUN (mg/dl)   Date Value   12/30/2015 9 (L)     BP Readings from Last 3 Encounters:   12/02/20 138/72   12/02/20 (!) 120/98   10/19/20 118/78          (goal 120/80)    Past Medical History:   Diagnosis Date    Diabetes mellitus ([facility] [address]       No past surgical history on file. No family history on file. Social History     Tobacco Use    Smoking status: Current Every Day Smoker     Packs/day: 1.00    Smokeless tobacco: Never Used   Substance Use Topics    Alcohol use: No      Current Outpatient Medications   Medication Sig Dispense Refill    gabapentin (NEURONTIN) 100 MG capsule Take 1 capsule by mouth 3 times daily for 30 days. Intended supply: 30 days 90 capsule 0    predniSONE (DELTASONE) 20 MG tablet Take 1 tablet by mouth 2 times daily for 5 days 10 tablet 0    blood glucose monitor strips Test 2 times a day & as needed for symptoms of irregular blood glucose. Dispense sufficient amount for indicated testing frequency plus additional to accommodate PRN testing needs. 200 strip 0    Blood Glucose Monitoring Suppl MISC For any brand Insurance will cover.   Use twice Day  E11.9 1 each 0    furosemide (LASIX) 20 MG tablet Take 1 tablet by mouth daily 30 tablet 3    DULoxetine (CYMBALTA) 60 MG extended release capsule Take 1 capsule by mouth daily 90 capsule 1    metFORMIN (GLUCOPHAGE-XR) 500 MG extended release tablet Take 2 tablets by mouth daily (with breakfast) 60 tablet 0    glucose blood VI test strips (ACCU-CHEK SMARTVIEW) strip 1 each by In Vitro route daily 100 each 3    Lancets MISC 1 each by Does not apply route daily 100 each 3     No current facility-administered medications for this visit. No Known Allergies    Health Maintenance   Topic Date Due    Diabetic retinal exam  11/01/1986    Lipid screen  11/01/1986    HIV screen  11/01/1991    Hepatitis B vaccine (1 of 3 - Risk 3-dose series) 11/01/1995    DTaP/Tdap/Td vaccine (1 - Tdap) 11/01/1995    Potassium monitoring  12/31/2016    Creatinine monitoring  12/31/2016    Flu vaccine (1) 09/01/2020    Diabetic foot exam  12/20/2020    A1C test (Diabetic or Prediabetic)  10/19/2021    Diabetic microalbuminuria test  10/19/2021    Pneumococcal 0-64 years Vaccine  Completed    Hepatitis A vaccine  Aged Out    Hib vaccine  Aged Out    Meningococcal (ACWY) vaccine  Aged Out       Subjective:      Review of Systems   Constitutional: Negative for chills, fever and unexpected weight change. HENT: Negative for congestion, hearing loss, rhinorrhea, sinus pressure and sore throat. Eyes: Negative for photophobia, discharge and visual disturbance. Respiratory: Negative for cough, chest tightness and shortness of breath. Cardiovascular: Negative for chest pain, palpitations and leg swelling. Gastrointestinal: Negative for abdominal distention, abdominal pain, constipation, diarrhea and vomiting. Endocrine: Negative for polydipsia and polyuria. Genitourinary: Negative for decreased urine volume, difficulty urinating, frequency and urgency. Musculoskeletal: Negative for arthralgias, gait problem and myalgias. Skin: Positive for rash. Allergic/Immunologic: Negative for food allergies. Neurological: Negative for dizziness, weakness, numbness and headaches.    Hematological: Negative for adenopathy. Psychiatric/Behavioral: Negative for dysphoric mood and sleep disturbance. The patient is not nervous/anxious. Objective:     /72   Pulse 78   Temp 96.4 °F (35.8 °C)   SpO2 94%   Physical Exam  Constitutional:       General: He is not in acute distress. Appearance: Normal appearance. He is not ill-appearing. HENT:      Head: Normocephalic and atraumatic. Right Ear: External ear normal.      Left Ear: External ear normal.      Nose: Nose normal.      Mouth/Throat:      Mouth: Mucous membranes are moist.   Eyes:      Extraocular Movements: Extraocular movements intact. Conjunctiva/sclera: Conjunctivae normal.      Pupils: Pupils are equal, round, and reactive to light. Neck:      Musculoskeletal: Normal range of motion and neck supple. Vascular: No carotid bruit. Cardiovascular:      Rate and Rhythm: Normal rate and regular rhythm. Pulses: Normal pulses. Heart sounds: Normal heart sounds. Pulmonary:      Effort: Pulmonary effort is normal. No respiratory distress. Breath sounds: Normal breath sounds. Abdominal:      General: Bowel sounds are normal. There is no distension. Tenderness: There is no abdominal tenderness. Musculoskeletal: Normal range of motion. Lymphadenopathy:      Cervical: No cervical adenopathy. Skin:     General: Skin is warm and dry. Neurological:      General: No focal deficit present. Mental Status: He is alert and oriented to person, place, and time. Psychiatric:         Mood and Affect: Mood normal.         Behavior: Behavior normal.         Thought Content: Thought content normal.         Assessment:       Diagnosis Orders   1. Contact dermatitis, unspecified contact dermatitis type, unspecified trigger  predniSONE (DELTASONE) 20 MG tablet   2. Neuropathy  gabapentin (NEURONTIN) 100 MG capsule    blood glucose monitor strips    Blood Glucose Monitoring Suppl MISC   3.  Diabetes 1.5, managed as type 2

## 2020-12-02 NOTE — ED PROVIDER NOTES
GLUCOSE BLOOD VI TEST STRIPS (ACCU-CHEK SMARTVIEW) STRIP    1 each by In Vitro route daily    LANCETS MISC    1 each by Does not apply route daily    METFORMIN (GLUCOPHAGE-XR) 500 MG EXTENDED RELEASE TABLET    Take 2 tablets by mouth daily (with breakfast)     ALLERGIES     has No Known Allergies. FAMILY HISTORY     has no family status information on file. SOCIAL HISTORY       Social History     Tobacco Use    Smoking status: Current Every Day Smoker     Packs/day: 1.00    Smokeless tobacco: Never Used   Substance Use Topics    Alcohol use: No    Drug use: Never     PHYSICAL EXAM     INITIAL VITALS: /76   Pulse 81   Temp 98.1 °F (36.7 °C) (Oral)   Resp 16   Ht 6' 1\" (1.854 m)   Wt 200 lb (90.7 kg)   SpO2 97%   BMI 26.39 kg/m²    Physical Exam  Vitals signs and nursing note reviewed. Constitutional:       General: He is not in acute distress. Appearance: He is well-developed. HENT:      Head: Normocephalic and atraumatic. Eyes:      Conjunctiva/sclera: Conjunctivae normal.   Neck:      Musculoskeletal: Neck supple. Cardiovascular:      Rate and Rhythm: Normal rate and regular rhythm. Pulmonary:      Effort: No respiratory distress. Musculoskeletal:      Comments: No swelling strong DP pulses bilaterally, no posterior calf tenderness no palpable cord   Skin:     General: Skin is warm and dry. Capillary Refill: Capillary refill takes less than 2 seconds. Comments: Blanching erythematous rash that is discrete along the back of the calf bilaterally more on the medial side than the lateral side with some fluid-filled blisters on the right worse than the left nontender to palpation    Neurological:      Mental Status: He is alert. EMERGENCY DEPARTMENTCOURSE:     Differential diagnosis includes cellulitis, DVT, contact dermatitis.   Clinically the patient has contact dermatitis he states that he had a new jeans yesterday and they are tight around the calf the patient

## 2021-01-07 DIAGNOSIS — M79.671 FOOT PAIN, BILATERAL: ICD-10-CM

## 2021-01-07 DIAGNOSIS — M79.672 FOOT PAIN, BILATERAL: ICD-10-CM

## 2021-01-07 DIAGNOSIS — G62.9 NEUROPATHY: ICD-10-CM

## 2021-01-08 RX ORDER — GABAPENTIN 100 MG/1
100 CAPSULE ORAL 3 TIMES DAILY
Qty: 90 CAPSULE | Refills: 0 | Status: SHIPPED | OUTPATIENT
Start: 2021-01-08 | End: 2021-03-15 | Stop reason: SDUPTHER

## 2021-01-08 RX ORDER — DULOXETIN HYDROCHLORIDE 60 MG/1
60 CAPSULE, DELAYED RELEASE ORAL DAILY
Qty: 90 CAPSULE | Refills: 1 | Status: SHIPPED | OUTPATIENT
Start: 2021-01-08 | End: 2021-07-07

## 2021-03-15 DIAGNOSIS — G62.9 NEUROPATHY: ICD-10-CM

## 2021-03-15 RX ORDER — GABAPENTIN 100 MG/1
100 CAPSULE ORAL 3 TIMES DAILY
Qty: 90 CAPSULE | Refills: 0 | Status: SHIPPED | OUTPATIENT
Start: 2021-03-15 | End: 2021-04-14

## 2021-07-06 DIAGNOSIS — M79.672 FOOT PAIN, BILATERAL: ICD-10-CM

## 2021-07-06 DIAGNOSIS — M79.671 FOOT PAIN, BILATERAL: ICD-10-CM

## 2021-07-07 RX ORDER — DULOXETIN HYDROCHLORIDE 60 MG/1
CAPSULE, DELAYED RELEASE ORAL
Qty: 90 CAPSULE | Refills: 1 | Status: SHIPPED | OUTPATIENT
Start: 2021-07-07